# Patient Record
(demographics unavailable — no encounter records)

---

## 2024-12-05 NOTE — HISTORY OF PRESENT ILLNESS
[FreeTextEntry1] : Patient is a 55 y/o male with a PMHx of hypertension, coronary artery calcification, hyperlipidemia, RBBB, lactose intolerance, tobacco dependence, Inguinal Hernia, s/p Inguinal Hernia repair, GERD, Gastritis, H. Pylori Infection, Colon Polyps, and Internal Hemorrhoids, who presents for EGD-Colonoscopy f/u. Pt is currently asymptomatic and feeling well. EGD-Colonoscopy was performed on 10/15/2024. EGD showed gastritis and was positive for H. Pylori. Colonoscopy showed a 4 mm sigmoid polyp and Internal Hemorrhoids. Pathology came back as a tubular adenoma.

## 2024-12-05 NOTE — HISTORY OF PRESENT ILLNESS
[FreeTextEntry1] : Patient is a 53 y/o male with a PMHx of hypertension, coronary artery calcification, hyperlipidemia, RBBB, lactose intolerance, tobacco dependence, Inguinal Hernia, s/p Inguinal Hernia repair, GERD, Gastritis, H. Pylori Infection, Colon Polyps, and Internal Hemorrhoids, who presents for EGD-Colonoscopy f/u. Pt is currently asymptomatic and feeling well. EGD-Colonoscopy was performed on 10/15/2024. EGD showed gastritis and was positive for H. Pylori. Colonoscopy showed a 4 mm sigmoid polyp and Internal Hemorrhoids. Pathology came back as a tubular adenoma.

## 2024-12-05 NOTE — REASON FOR VISIT
[Post-op/Procedure: _________] : a [unfilled] post-op/procedure visit [FreeTextEntry1] : H. Pylori Infection

## 2024-12-05 NOTE — ASSESSMENT
[FreeTextEntry1] : Will treat pt with 10-day course of Pylera. Will arrange for pt to have H. Pylori Breath Test 4 weeks after finishing the Pylera. Pt expressed understanding and agrees with the plan.  I reviewed the following at length with the patient: Helicobacter Pylori (H. Pylori) is a type of bacteria that causes infection in the stomach. It is the main cause of peptic ulcers and it can also cause gastritis and stomach cancer. About 30 to 40 percent of people in the [place] can get an H. Pylori infection. Most people get it as a child. H. Pylori usually does not cause symptoms, but it can break down the inner protective coating in some people's stomachs and cause inflammation. This can lead to gastritis or a peptic ulcer. A peptic ulcer causes a dull or burning pain in your stomach, especially when you have an empty stomach. It lasts for minutes to hours, and it may come and go for several days or weeks. It may also cause other symptoms, such as bloating, nausea, and weight loss. If you have the symptoms of a peptic ulcer, your health care provider will check to see whether you have H. Pylori. There are blood, breath, and stool tests to check for H. Pylori. In some cases, you may need an upper endoscopy, often with a biopsy.  A low acid/reflux diet was discussed in great detail including not smoking, not drinking alcohol, and not consuming foods that irritate the esophagus. It is helpful to eat small meals throughout the day instead of large meals. You should avoid eating before bedtime or lying down after you eat. It can be helpful to raise the head of your bed six inches. Additionally, you should maintain a healthy weight and good posture. The patient was given written material to take home and review.  I spent 35 minutes reviewing the patient's records prior to arrival, with the patient, and reviewing records after the visit. All prior testing reviewed at length. Patient verbalized understanding of all information provided. All questions answered and reviewed.  Robert Brunner, MD

## 2025-03-11 NOTE — ASSESSMENT
[FreeTextEntry1] : 53 yo M who presents to discuss weight loss However, patient's weight has been stable over the past few months  Recent colonoscopy neg for malignancy, + H pylori Repeat testing needed Routine metabolic labs ordered and drawn in office F/u with PCP  All of the patient's questions and concerns were answered in detail.

## 2025-03-11 NOTE — ASSESSMENT
[FreeTextEntry1] : 55 yo M who presents to discuss weight loss However, patient's weight has been stable over the past few months  Recent colonoscopy neg for malignancy, + H pylori Repeat testing needed Routine metabolic labs ordered and drawn in office F/u with PCP  All of the patient's questions and concerns were answered in detail.

## 2025-03-11 NOTE — HEALTH RISK ASSESSMENT
[No] : No [Never (0 pts)] : Never (0 points) [Yes] : In the past 12 months have you used drugs other than those required for medical reasons? Yes [No falls in past year] : Patient reported no falls in the past year [1] : 2) Feeling down, depressed, or hopeless for several days (1) [de-identified] : no [de-identified] : GI & Cardiologist  [Audit-CScore] : 0 [de-identified] : walking  [de-identified] : eating more  [[id number]] : 2

## 2025-03-11 NOTE — HISTORY OF PRESENT ILLNESS
[FreeTextEntry1] : weight loss  [de-identified] : The patient is a 54 year old M who presents to the office to discuss weight loss Objectively: 182 lbs in 06/2023 ---> 162lbs today  Patient has however gained 4lbs in the past 3-4 months. His appetite is unchanged. He completed therapy for H pylori. No follow up testing  No CP, dyspnea, fever, chills, diarrhea, constipation.

## 2025-03-11 NOTE — HISTORY OF PRESENT ILLNESS
[FreeTextEntry1] : weight loss  [de-identified] : The patient is a 54 year old M who presents to the office to discuss weight loss Objectively: 182 lbs in 06/2023 ---> 162lbs today  Patient has however gained 4lbs in the past 3-4 months. His appetite is unchanged. He completed therapy for H pylori. No follow up testing  No CP, dyspnea, fever, chills, diarrhea, constipation.

## 2025-03-11 NOTE — HEALTH RISK ASSESSMENT
[No] : No [Never (0 pts)] : Never (0 points) [Yes] : In the past 12 months have you used drugs other than those required for medical reasons? Yes [No falls in past year] : Patient reported no falls in the past year [1] : 2) Feeling down, depressed, or hopeless for several days (1) [de-identified] : no [Audit-CScore] : 0 [de-identified] : GI & Cardiologist  [de-identified] : walking  [de-identified] : eating more  [[id number]] : 2